# Patient Record
Sex: MALE | Race: OTHER | HISPANIC OR LATINO | URBAN - METROPOLITAN AREA
[De-identification: names, ages, dates, MRNs, and addresses within clinical notes are randomized per-mention and may not be internally consistent; named-entity substitution may affect disease eponyms.]

---

## 2024-05-07 ENCOUNTER — EMERGENCY (EMERGENCY)
Facility: HOSPITAL | Age: 39
LOS: 1 days | Discharge: HOME | End: 2024-05-07
Admitting: STUDENT IN AN ORGANIZED HEALTH CARE EDUCATION/TRAINING PROGRAM
Payer: SELF-PAY

## 2024-05-07 VITALS
RESPIRATION RATE: 18 BRPM | HEART RATE: 100 BPM | SYSTOLIC BLOOD PRESSURE: 117 MMHG | OXYGEN SATURATION: 97 % | DIASTOLIC BLOOD PRESSURE: 70 MMHG | TEMPERATURE: 98 F

## 2024-05-07 LAB
HCV AB S/CO SERPL IA: 0.14 S/CO — SIGNIFICANT CHANGE UP (ref 0–0.99)
HCV AB SERPL-IMP: SIGNIFICANT CHANGE UP
HIV 1+2 AB+HIV1 P24 AG SERPL QL IA: SIGNIFICANT CHANGE UP

## 2024-05-07 PROCEDURE — 99284 EMERGENCY DEPT VISIT MOD MDM: CPT

## 2024-05-07 RX ORDER — CEPHALEXIN 500 MG
1 CAPSULE ORAL
Qty: 20 | Refills: 0
Start: 2024-05-07 | End: 2024-05-16

## 2024-05-07 RX ORDER — IBUPROFEN 200 MG
600 TABLET ORAL ONCE
Refills: 0 | Status: COMPLETED | OUTPATIENT
Start: 2024-05-07 | End: 2024-05-07

## 2024-05-07 RX ORDER — CEPHALEXIN 500 MG
500 CAPSULE ORAL ONCE
Refills: 0 | Status: COMPLETED | OUTPATIENT
Start: 2024-05-07 | End: 2024-05-07

## 2024-05-07 RX ADMIN — Medication 600 MILLIGRAM(S): at 09:53

## 2024-05-07 RX ADMIN — Medication 500 MILLIGRAM(S): at 09:53

## 2024-05-07 NOTE — ED PROVIDER NOTE - CLINICAL SUMMARY MEDICAL DECISION MAKING FREE TEXT BOX
38-year-old male no past medical history presents ED complaining of low back pain x 1 week.  Patient states feels like something is firm and red in the area.  Feels like it is getting more swollen.  Denies any trauma, fall, weakness, numbness, fever, chills, CP, SOB, abdominal pain, N/V/D or ever having this before.  lumbar region: 5x5 cm induration, warm, erythematous, nonfluctuant  consistent with cellulitis- no systemic symptoms.  Will start on keflex and have pt follow up with wound clinic.

## 2024-05-07 NOTE — ED PROVIDER NOTE - OBJECTIVE STATEMENT
38-year-old male no past medical history presents ED complaining of low back pain x 1 week.  Patient states feels like something is firm and red in the area.  Feels like it is getting more swollen.  Denies any trauma, fall, weakness, numbness, fever, chills, CP, SOB, abdominal pain, N/V/D or ever having this before.

## 2024-05-07 NOTE — ED PROVIDER NOTE - NSFOLLOWUPINSTRUCTIONS_ED_ALL_ED_FT
You were seen in the emergency department for low back pain.  You were found to have an infection in your skin.  You are being prescribed antibiotics.  Take Keflex 500 mg twice a day for 10 days.  Be sure to apply warm compresses to area at least 4 times a day 20 minutes at a time.  Follow-up with our wound clinic in 2 days or you can see your primary care doctor in 2 days as well to make sure that it is not getting worse.  If redness/swelling/pain is getting worse and you are having fevers be sure to come back to the emergency department.    Le atendieron en urgencias por dolor lumbar. Se descubrió que tenía luis infección en la piel. Le están recetando antibióticos.  [place] Keflex 500 mg dos veces al día trey 10 días.  Asegúrese de aplicar compresas tibias en el área al menos 4 veces al día, 20 minutos a la vez.  Justa un seguimiento con nuestra clínica de heridas en 2 días o también puede consultar a whitehead médico de atención primaria en 2 días para asegurarse de que no empeore.  Si el enrojecimiento/hinchazón/dolor empeora y tiene fiebre, asegúrese de regresar al departamento de emergencias.

## 2024-05-07 NOTE — ED PROVIDER NOTE - PATIENT PORTAL LINK FT
You can access the FollowMyHealth Patient Portal offered by [facility] by registering at the following website: [email or web address]. By joining [facility]’s FollowMyHealth portal, you will also be able to view your health information using other applications (apps) compatible with our system.

## 2024-05-07 NOTE — ED ADULT NURSE NOTE - OBJECTIVE STATEMENT
Pt received to intake 10c    , awake and alert, A&OX4, ambulatory. C/o back pain. pt denies any injury to the area. some redness noted to the area.     Respirations even and unlabored. Denies CP, SOB, N/V, HA, dizziness, palpitations, blurry vision. Bed in lowest position Safety maintained.